# Patient Record
Sex: FEMALE | Race: WHITE | Employment: FULL TIME | URBAN - METROPOLITAN AREA
[De-identification: names, ages, dates, MRNs, and addresses within clinical notes are randomized per-mention and may not be internally consistent; named-entity substitution may affect disease eponyms.]

---

## 2017-05-19 ENCOUNTER — HOSPITAL ENCOUNTER (EMERGENCY)
Age: 57
Discharge: HOME | End: 2017-05-19
Attending: EMERGENCY MEDICINE
Payer: OTHER GOVERNMENT

## 2017-05-19 ENCOUNTER — HOSPITAL ENCOUNTER (OUTPATIENT)
Dept: CT IMAGING | Age: 57
Discharge: HOME | End: 2017-05-19
Attending: EMERGENCY MEDICINE
Payer: OTHER GOVERNMENT

## 2017-05-19 VITALS
SYSTOLIC BLOOD PRESSURE: 127 MMHG | HEIGHT: 68 IN | HEART RATE: 84 BPM | BODY MASS INDEX: 23.27 KG/M2 | TEMPERATURE: 98.6 F | DIASTOLIC BLOOD PRESSURE: 85 MMHG | WEIGHT: 153.5 LBS | RESPIRATION RATE: 18 BRPM | OXYGEN SATURATION: 99 %

## 2017-05-19 DIAGNOSIS — R50.9 FEVER: ICD-10-CM

## 2017-05-19 DIAGNOSIS — R10.9 ABDOMINAL PAIN: ICD-10-CM

## 2017-05-19 DIAGNOSIS — K57.32 DIVERTICULITIS OF LARGE INTESTINE WITHOUT PERFORATION OR ABSCESS WITHOUT BLEEDING: Primary | ICD-10-CM

## 2017-05-19 PROCEDURE — 74177 CT ABD & PELVIS W/CONTRAST: CPT

## 2017-05-19 PROCEDURE — 99282 EMERGENCY DEPT VISIT SF MDM: CPT

## 2017-05-19 PROCEDURE — 74011636320 HC RX REV CODE- 636/320: Performed by: RADIOLOGY

## 2017-05-19 PROCEDURE — 74011250637 HC RX REV CODE- 250/637: Performed by: EMERGENCY MEDICINE

## 2017-05-19 RX ORDER — DOXYCYCLINE HYCLATE 100 MG
100 TABLET ORAL
Status: DISCONTINUED | OUTPATIENT
Start: 2017-05-19 | End: 2017-05-20 | Stop reason: HOSPADM

## 2017-05-19 RX ORDER — METRONIDAZOLE 500 MG/1
500 TABLET ORAL 3 TIMES DAILY
Qty: 30 TAB | Refills: 0 | Status: SHIPPED | OUTPATIENT
Start: 2017-05-19 | End: 2017-05-29

## 2017-05-19 RX ORDER — EPINEPHRINE 0.3 MG/.3ML
0.3 INJECTION SUBCUTANEOUS
Qty: 2 SYRINGE | Refills: 0 | Status: SHIPPED | OUTPATIENT
Start: 2017-05-19

## 2017-05-19 RX ORDER — DOXYCYCLINE HYCLATE 100 MG
100 TABLET ORAL 2 TIMES DAILY
Qty: 20 TAB | Refills: 0 | Status: SHIPPED | OUTPATIENT
Start: 2017-05-19 | End: 2017-05-29

## 2017-05-19 RX ORDER — METRONIDAZOLE 250 MG/1
500 TABLET ORAL
Status: COMPLETED | OUTPATIENT
Start: 2017-05-19 | End: 2017-05-19

## 2017-05-19 RX ADMIN — METRONIDAZOLE 500 MG: 250 TABLET, FILM COATED ORAL at 21:21

## 2017-05-19 RX ADMIN — IOPAMIDOL 100 ML: 755 INJECTION, SOLUTION INTRAVENOUS at 19:06

## 2017-05-19 NOTE — ED PROVIDER NOTES
HPI Comments: 61 y.o. female with no significant past medical history who presents from home with chief complaint of abdominal pain. Per pt, she was seen earlier today where she received a CT scan which showed, \"diverticulitis with questionable areas\". The pt adds that she has been experiencing moderate abdominal pain for the past 3x days which appeared to worsen yesterday (5/18/2017). The pt rates her current pain 6/10. She notes that she was advised to be seen at the ED for further evaluation and recommended colonoscopy. Per pt, she has hx of diverticulitis in February of 2016 where she was given doxycycline which provided her relief. She denies fever, chills,  N/V/D, and urinary symptoms. There are no other acute medical concerns at this time. Social hx: None      PCP: Sathya Maloney MD    Note written by Yon Stewart, as dictated by Raman Mitchell. Myriam Ordaz MD 8:20 PM          The history is provided by the patient. No past medical history on file. No past surgical history on file. No family history on file. Social History     Social History    Marital status:      Spouse name: N/A    Number of children: N/A    Years of education: N/A     Occupational History    Not on file. Social History Main Topics    Smoking status: Not on file    Smokeless tobacco: Not on file    Alcohol use Not on file    Drug use: Not on file    Sexual activity: Not on file     Other Topics Concern    Not on file     Social History Narrative         ALLERGIES: Neomycin; Pcn [penicillins]; and Sulfa (sulfonamide antibiotics)    Review of Systems   Constitutional: Negative for chills and fever. HENT: Negative for ear pain and sore throat. Eyes: Negative for pain. Respiratory: Negative for chest tightness and shortness of breath. Cardiovascular: Negative for chest pain and leg swelling.    Gastrointestinal: Positive for abdominal pain (diffuse pain present for past three days and worsened yesterday (5/18/2017)). Negative for diarrhea, nausea and vomiting. Genitourinary: Negative for dysuria and flank pain. Musculoskeletal: Negative for back pain. Skin: Negative for rash. Neurological: Negative for headaches. All other systems reviewed and are negative. Vitals:    05/19/17 1948   BP: (!) 159/94   Pulse: 89   Resp: 18   Temp: 98.6 °F (37 °C)   SpO2: 98%   Weight: 69.6 kg (153 lb 8 oz)   Height: 5' 8\" (1.727 m)            Physical Exam   Constitutional: She appears well-developed and well-nourished. HENT:   Head: Normocephalic and atraumatic. Mouth/Throat: Oropharynx is clear and moist.   Eyes: Conjunctivae and EOM are normal. Pupils are equal, round, and reactive to light. No scleral icterus. Neck: Neck supple. No tracheal deviation present. Cardiovascular: Normal rate, regular rhythm, normal heart sounds and intact distal pulses. Pulmonary/Chest: Effort normal and breath sounds normal. No respiratory distress. Abdominal: Soft. She exhibits no distension. There is tenderness (present to lower quadrant). There is no rebound and no guarding. Genitourinary:   Genitourinary Comments: deferred   Musculoskeletal: She exhibits no edema. Neurological: She is alert. Skin: Skin is warm and dry. Psychiatric: She has a normal mood and affect. Nursing note and vitals reviewed. Note written by Yon Langston, as dictated by Jackelin Smith MD 8:20 PM      MDM  ED Course         MDM:  61 y.o. female with past medical history significant for diverticulitis, multiple medication allergies presents with outpatient CT scan showing diverticulitis with area suspicious for malignancy. Is working here from [place] city. Her PCP is setting her up with a colonoscopy. Patient is well appearing with normal vitals. Does have moderate LLQ tenderness.   Outpatient management complicated by lack of tolerance to antibiotics as she states she has only tolerate Doxycycline in the past and this has cleared up her prior episode of divericulitis. Gave Flagyl and doxycycline in ED and observed > 30 minutes. Patient had mild chest tightness prior to discharge but she attributed to anxiety. No signs of anaphylaxis. Prescribed epi-pen in unlikely case. LABORATORY TESTS:  Labs Reviewed - No data to display    IMAGING RESULTS:  No results found. MEDICATIONS GIVEN:  Medications   metroNIDAZOLE (FLAGYL) tablet 500 mg (500 mg Oral Given 5/19/17 2121)       IMPRESSION:  1.  Diverticulitis of large intestine without perforation or abscess without bleeding        PLAN:  -  Doxycycline, flagyl  -   Follow-up Information     Follow up With Details Comments Contact Info    Phys Other, MD Call  Patient can only remember the practice name and not the physician      [address] [facility] Call in 3 days  [address]  [phone]    [facility] EMERGENCY DEPT Go in 3 days for re-check [address] [facility]  [phone]    [facility] EMERGENCY DEPT  If symptoms worsen [address] [facility]  [phone]          Disposition:  home, see patient instructions for treatment and plan    Condition:  stable    Total critical care time spent exclusive of procedures:  0 minutes    Jerica Theodore MD      Procedures

## 2017-05-20 NOTE — DISCHARGE INSTRUCTIONS
Diverticulitis: Care Instructions  Your Care Instructions    Diverticulitis occurs when pouches form in the wall of the colon and become inflamed or infected. It can be very painful. Doctors aren't sure what causes diverticulitis. There is no proof that foods such as nuts, seeds, or berries cause it or make it worse. A low-fiber diet may cause the colon to work harder to push stool forward. Pouches may form because of this extra work. It may be hard to think about healthy eating while you're in pain. But as you recover, you might think about how you can use healthy eating for overall better health. Healthy eating may help you avoid future attacks. Follow-up care is a key part of your treatment and safety. Be sure to make and go to all appointments, and call your doctor if you are having problems. It's also a good idea to know your test results and keep a list of the medicines you take. How can you care for yourself at home? · Drink plenty of fluids, enough so that your urine is light yellow or clear like water. If you have kidney, heart, or liver disease and have to limit fluids, talk with your doctor before you increase the amount of fluids you drink. · Stick to liquids or a bland diet (plain rice, bananas, dry toast or crackers, applesauce) until you are feeling better. Then you can return to regular foods and gradually increase the amount of fiber in your diet. · Use a heating pad set on low on your belly to relieve mild cramps and pain. · Get extra rest until you are feeling better. · Be safe with medicines. Read and follow all instructions on the label. ¨ If the doctor gave you a prescription medicine for pain, take it as prescribed. ¨ If you are not taking a prescription pain medicine, ask your doctor if you can take an over-the-counter medicine. · If your doctor prescribed antibiotics, take them as directed. Do not stop taking them just because you feel better.  You need to take the full course of antibiotics. To prevent future attacks of diverticulitis  · Avoid constipation:  ¨ Include fruits, vegetables, beans, and whole grains in your diet each day. These foods are high in fiber. ¨ Drink plenty of fluids, enough so that your urine is light yellow or clear like water. If you have kidney, heart, or liver disease and have to limit fluids, talk with your doctor before you increase the amount of fluids you drink. ¨ Get some exercise every day. Build up slowly to 30 to 60 minutes a day on 5 or more days of the week. ¨ Take a fiber supplement, such as Citrucel or Metamucil, every day if needed. Read and follow all instructions on the label. ¨ Schedule time each day for a bowel movement. Having a daily routine may help. Take your time and do not strain when having a bowel movement. When should you call for help? Call 911 anytime you think you may need emergency care. For example, call if:  · You passed out (lost consciousness). · You vomit blood or what looks like coffee grounds. · You pass maroon or very bloody stools. Call your doctor now or seek immediate medical care if:  · You have severe pain or swelling in your belly. · You have a new or higher fever. · You cannot keep down fluids or medicines. · You have new pain that gets worse when you move or cough. Watch closely for changes in your health, and be sure to contact your doctor if:  · The symptoms you had when you first started feeling sick come back. · You do not get better as expected. Where can you learn more? Go to http://alisia-ariel.info/. Enter H901 in the search box to learn more about \"Diverticulitis: Care Instructions. \"  Current as of: August 9, 2016  Content Version: 11.2  © 0198-7329 [employer]. Care instructions adapted under license by [employer] (which disclaims liability or warranty for this information).  If you have questions about a medical condition or this instruction, always ask your healthcare professional. [place] [address] any warranty or liability for your use of this information. We hope that we have addressed all of your medical concerns. The examination and treatment you received in the Emergency Department were for an emergent problem and were not intended as complete care. It is important that you follow up with your healthcare provider(s) for ongoing care. If your symptoms worsen or do not improve as expected, and you are unable to reach your usual health care provider(s), you should return to the Emergency Department. Today's healthcare is undergoing tremendous change, and patient satisfaction surveys are one of the many tools to assess the quality of medical care. You may receive a survey from the [employer] regarding your experience in the Emergency Department. I hope that your experience has been completely positive, particularly the medical care that I provided. As such, please participate in the survey; anything less than excellent does not meet my expectations or intentions. [facility] and [address] [place] [address] participate in nationally recognized quality of care measures. If your blood pressure is greater than 120/80, as reported below, we urge that you seek medical care to address the potential of high blood pressure, commonly known as hypertension. Hypertension can be hereditary or can be caused by certain medical conditions, pain, stress, or \"white coat syndrome. \"       Please make an appointment with your health care provider(s) for follow up of your Emergency Department visit. VITALS:   Patient Vitals for the past 8 hrs:   Temp Pulse Resp BP SpO2   05/19/17 1948 98.6 °F (37 °C) 89 18 (!) 159/94 98 %          Thank you for allowing us to provide you with medical care today. We realize that you have many choices for your emergency care needs.   Please choose us in the future for any continued health care needs. [place] Ugarte [facility], [address] [facility] [address]: [phone]            No results found for this or any previous visit (from the past 24 hour(s)). Ct Abd Pelv W Cont    Result Date: 5/19/2017  Clinical history: Left lower quadrant pain INDICATION: abdominal  pain llq temp 100 COMPARISON: None TECHNIQUE: Following the uneventful intravenous administration of 100 cc Isovue-370, thin axial images were obtained through the abdomen and pelvis. Coronal and sagittal reconstructions were generated. Oral contrast was not administered. CT dose reduction was achieved through use of a standardized protocol tailored for this examination and automatic exposure control for dose modulation. FINDINGS: FINDINGS: CRITICAL FINDINGS: There is transverse colonic wall thickening with an associated colonic diverticula. Pericolonic inflammatory stranding. No adenopathy. No perforation. No free air. No drainable fluid collection. 45 mm length of colonic wall thickening. There is a second of colonic wall abnormality in the sigmoid colon. INCIDENTALS: No mass or biliary dilatation. LIVER: Tiny hepatic hypodensities likely simple cyst. No mass or biliary dilatation. There is no intrahepatic duct dilatation. The CBD is not dilated. There is no hepatic parenchymal mass. Hepatic enhancement pattern is within normal limits. SPLEEN/PANCREAS: No mass. There is no pancreatic duct dilatation. There is no evidence of splenomegaly. No focal ADRENALS/KIDNEYS: There is no adrenal mass. There is no hydroureteronephrosis. There is no renal or ureteral calculus. There is no renal mass. There is no perinephric mass. COLON AND SMALL BOWEL: Colonic wall thickening sigmoid and transverse colon. APPENDIX: Unremarkable. URINARY BLADDER: No mass or calculus. BONES/RETROPERITONEUM: No destructive bone lesion. , abdominal aorta normal in caliber.  No aneurysm. No fracture. No retroperitoneal adenopathy. Disc degenerative change L4-5 with canal stenosis. Canal stenosis at L3-4 as well. IMPRESSION: Transverse colonic wall thickening with associated diverticulum and mesenteric inflammatory stranding. In addition a second area of colonic wall thickening in the sigmoid colon. The sigmoid colonic focus has imaging features which are more typical of diverticulitis. The lesion of the transverse colon is suspicious. While still likely related to a diverticulitis underlying neoplastic process is not excluded. This patient will require short-term imaging follow-up or colonoscopy. There is no abscess or drainable fluid collection. There is no obstruction. There is no free air. The findings were called to Dr. Tadeo Wright in [place] on 5/19/2017 at 7:28 PM by Dr. Gautam Morales. [address] Cc: Tadeo Wright M.D.  [place]

## 2024-03-31 NOTE — ED TRIAGE NOTES
All catheters exchanged over wire.  Pt arrives with c/o of abdominal pain x 3 days worsening yesterday. Pt states she was just seen at CT and dx with \"diverticulitis with questionable areas\". Pt reports hx of diverticulitis. Pt reports nausea and diarrhea earlier in the week, none today.